# Patient Record
Sex: FEMALE | Race: WHITE | NOT HISPANIC OR LATINO | URBAN - METROPOLITAN AREA
[De-identification: names, ages, dates, MRNs, and addresses within clinical notes are randomized per-mention and may not be internally consistent; named-entity substitution may affect disease eponyms.]

---

## 2018-05-17 ENCOUNTER — EMERGENCY (EMERGENCY)
Facility: HOSPITAL | Age: 48
LOS: 1 days | Discharge: HOME | End: 2018-05-17
Admitting: EMERGENCY MEDICINE
Payer: COMMERCIAL

## 2018-05-17 VITALS
TEMPERATURE: 98 F | HEART RATE: 72 BPM | RESPIRATION RATE: 18 BRPM | DIASTOLIC BLOOD PRESSURE: 70 MMHG | OXYGEN SATURATION: 100 % | SYSTOLIC BLOOD PRESSURE: 124 MMHG
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
## 2018-05-17 DIAGNOSIS — M25.571 PAIN IN RIGHT ANKLE AND JOINTS OF RIGHT FOOT: ICD-10-CM
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
## 2018-05-17 DIAGNOSIS — Z91.040 LATEX ALLERGY STATUS: ICD-10-CM

## 2018-05-17 DIAGNOSIS — S93.401A SPRAIN OF UNSPECIFIED LIGAMENT OF RIGHT ANKLE, INITIAL ENCOUNTER: ICD-10-CM

## 2018-05-17 PROCEDURE — 99283 EMERGENCY DEPT VISIT LOW MDM: CPT | Mod: 25

## 2018-05-17 PROCEDURE — 73630 X-RAY EXAM OF FOOT: CPT | Mod: 26,RT

## 2018-05-17 PROCEDURE — 73610 X-RAY EXAM OF ANKLE: CPT | Mod: 26,RT

## 2018-05-17 RX ORDER — BACITRACIN ZINC 500 UNIT/G
1 OINTMENT IN PACKET (EA) TOPICAL ONCE
Qty: 0 | Refills: 0 | Status: COMPLETED | OUTPATIENT
Start: 2018-05-17 | End: 2018-05-17

## 2018-05-17 RX ORDER — IBUPROFEN 200 MG
600 TABLET ORAL ONCE
Qty: 0 | Refills: 0 | Status: COMPLETED | OUTPATIENT
Start: 2018-05-17 | End: 2018-05-17

## 2018-05-17 RX ADMIN — Medication 600 MILLIGRAM(S): at 20:03

## 2018-05-17 RX ADMIN — Medication 1 APPLICATION(S): at 21:30

## 2018-05-17 NOTE — ED ADULT NURSE REASSESSMENT NOTE - NS ED NURSE REASSESS COMMENT FT1
assumed care of patient from RN Myerson; patient here with trip and fall in street; twisted right ankle; due for xrays; given pain meds as per MD order; will continue to monitor

## 2018-05-17 NOTE — ED PROVIDER NOTE - MEDICAL DECISION MAKING DETAILS
Give ibuprofen. XR of R ankle r/o possible ankle fracture. right ankle sprain, nvi, prelim xrays neg, ibuprofen/tylenol prn otc, leg elevation, cool compresses, f/u ortho, dc home with cam boot, ambulatory

## 2018-05-17 NOTE — ED PROVIDER NOTE - MUSCULOSKELETAL, MLM
RLE; moderate swollen to lateral malleolus w/ TTP. No sensory or motor deficit. Ambulatory. DP intact. No foot tenderness. Minor abrasion. No proximal fib tenderness, no knee tenderness. RLE: moderate swelling to lateral malleolus w/ TTP. No sensory or motor deficit. Ambulatory. distal pulses intact. No foot tenderness. Minor abrasion to right knee. No proximal tib fib tenderness, no knee tenderness. soft compartments

## 2018-05-17 NOTE — ED PROVIDER NOTE - DIAGNOSTIC INTERPRETATION
Interpreted by CHERYL Lieberman  Foot x-ray, 3 views  No fracture, no dislocation (joint spaces grossly normal), no Foreign Body noted, soft tissue normal   Interpreted by CHERYL Lieberman  Ankle x-ray, 3 views  No fracture, no dislocation (joint spaces grossly normal), no Foreign Body noted, soft tissue swollen to lateral malleolus. Interpreted by CHERYL Lieberman  Foot x-ray, 3 views  No fracture, no dislocation (joint spaces grossly normal), no Foreign Body noted, soft tissue normal   Interpreted by CHERYL Lieberman  Ankle x-ray, 3 views  No fracture, no dislocation (joint spaces grossly normal), no Foreign Body noted, soft tissue swolling to lateral malleolus.

## 2018-05-17 NOTE — ED PROVIDER NOTE - OBJECTIVE STATEMENT
49 y/o F w/ no PMHx brought in by EMS presents to ED w/ c/o ankle pain w/ no radiation. Pt reports stepping into a deep pothole, twisting her R ankle and falling onto the ground. Pt believes she is UTD w/ her tetanus shot. Pt was able to get up and bare weight. Denies numbness and tingling. 47 y/o F w/ no PMHx brought in by EMS presents to ED w/ c/o right ankle pain. Pt reports stepping into a deep pothole, twisting her R ankle and scraping her right knee. 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 Denies numbness and tingling. Able to bear weight. No head trauma, no LOC. no other injuries.
